# Patient Record
Sex: MALE | Race: BLACK OR AFRICAN AMERICAN
[De-identification: names, ages, dates, MRNs, and addresses within clinical notes are randomized per-mention and may not be internally consistent; named-entity substitution may affect disease eponyms.]

---

## 2019-01-12 NOTE — RADIOLOGY REPORT (SQ)
EXAM DESCRIPTION: 



XR CHEST 1 VIEW



COMPLETED DATE/TME:  01/11/2019 23:55



CLINICAL HISTORY: 



28 years, Male, cp



COMPARISON:

None.



NUMBER OF VIEWS:

1



TECHNIQUE:

Portable chest



LIMITATIONS:

None.



FINDINGS:



Heart size normal. Lungs are clear. No pneumothorax



IMPRESSION:



Negative chest

 



copyright 2011 [employer]- All Rights Reserved

## 2019-01-12 NOTE — ER DOCUMENT REPORT
ED General





- General


Chief Complaint: Chest Pain


Stated Complaint: CHEST PAIN


Time Seen by Provider: 01/11/19 23:54


Notes: 





Patient is a 28-year-old male with a past medical history of hypertension, 

morbid obesity, presents with 3-4 days of intermittent left-sided chest 

discomfort.  Describes it as stabbing, twinging pains to the left lower pectoral

region that come and go randomly.  States they last for several seconds and then

go away spontaneously.  Nothing improves or worsens his symptoms.  States this 

feels similar to when he has irritated muscles of his chest wall in the past.  

Denies associated radiation of the pain, shortness of breath, nausea, vomiting 

or diaphoresis.  Has not seen his primary care doctor regarding today's 

concerns.


TRAVEL OUTSIDE OF THE [place] IN LAST 30 DAYS: No





Past Medical History





- General


Information source: Patient





- Social History


Smoking Status: Current Every Day Smoker


Chew tobacco use (# tins/day): No


Frequency of alcohol use: None


Drug Abuse: None


Lives with: Family


Family History: Reviewed & Not Pertinent


Patient has suicidal ideation: No


Patient has homicidal ideation: No





- Past Medical History


Cardiac Medical History: Reports: Hx Hypertension


Renal/ Medical History: Denies: Hx Peritoneal Dialysis





Review of Systems





- Review of Systems


Notes: 





Constitutional: Negative for fever.


HENT: Negative for sore throat.


Eyes: Negative for visual changes.


Cardiovascular: Positive for chest pain.


Respiratory: Negative for shortness of breath.


Gastrointestinal: Negative for abdominal pain, vomiting or diarrhea.


Genitourinary: Negative for dysuria.


Musculoskeletal: Negative for back pain.


Skin: Negative for rash.


Neurological: Negative for headaches, weakness or numbness.





10 point ROS negative except as marked above and in HPI.





Physical Exam





- Vital signs


Vitals: 


                                        











Temp Pulse Resp BP Pulse Ox


 


 98.4 F   60   16   162/83 H  97 


 


 01/11/19 22:33  01/11/19 22:33  01/11/19 22:33  01/11/19 22:33  01/11/19 22:33











Interpretation: Hypertensive


Notes: 





PHYSICAL EXAMINATION:





GENERAL: Well-appearing, well-nourished and in no acute distress.





HEAD: Atraumatic, normocephalic.





EYES: Pupils equal round and reactive to light, extraocular movements intact, 

sclera anicteric, conjunctiva are normal.





ENT: nares patent, oropharynx clear without exudates.  Moist mucous membranes.





NECK: Normal range of motion, supple without lymphadenopathy





LUNGS: Breath sounds clear to auscultation bilaterally and equal.  No wheezes 

rales or rhonchi.





HEART: Regular rate and rhythm without murmurs





Chest wall: Reproduction of pain on palpation of the lateral and central 

inferior portion of the left chest wall





ABDOMEN: Soft, morbidly obese abdomen, nontender, normoactive bowel sounds.  No 

guarding, no rebound.  No masses appreciated.





EXTREMITIES: Normal range of motion, no pitting or edema.  No cyanosis.





NEUROLOGICAL: No focal neurological deficits. Moves all extremities 

spontaneously and on command.





PSYCH: Normal mood, normal affect.





SKIN: Warm, Dry, normal turgor, no rashes or lesions noted.





Course





- Re-evaluation


Re-evalutation: 





01/12/19 01:26


Presentation of chest pain in an otherwise well appearing patient. Low clinical 

suspicion for ACS given clinical history, exam, EKG without ST elevations or 

depressions, and negative initial troponin. HEART score less than or equal to 3.

PE also seems unlikely given clinical history, absence of tachycardia or dyspne

a. Patient is PERC criteria negative. CXR without evidence of pneumothorax or 

pneumonia. No widened mediastinum. Aortic dissection also seems unlikely given 

history, symmetric pulses, CXR, and vitals.  Chest pain has been intermittent 

over the last 3 days, I do not believe serial troponin markers are indicated.  

Patient's pain is completely reproducible on palpation of the left lower chest, 

consistent with likely musculoskeletal source.  At this time will discharge with

return precautions and follow-up recommendations.  Verbal discharge instructions

given a the bedside and opportunity for questions given. Medication warnings 

reviewed. Patient is in agreement with this plan and has verbalized 

understanding of return precautions and the need for primary care follow-up in 

the next 24-72 hours.





- Vital Signs


Vital signs: 


                                        











Temp Pulse Resp BP Pulse Ox


 


 98.1 F   60   12   157/108 H  100 


 


 01/12/19 01:38  01/11/19 22:33  01/12/19 01:01  01/12/19 01:01  01/12/19 01:01














- Diagnostic Test


Radiology reviewed: Image reviewed, Reports reviewed


Radiology results interpreted by me: 





01/12/19 01:26


Chest x-ray: No acute infiltrate or pneumothorax





- EKG Interpretation by Me


Additional EKG results interpreted by me: 





01/12/19 01:27


Sinus rhythm, rate 88.  No ST elevations or depressions.  QTC is 417.





Discharge





- Discharge


Clinical Impression: 


 Chest wall pain





Hypertension


Qualifiers:


 Hypertension type: essential hypertension Qualified Code(s): I10 - Essential 

(primary) hypertension





Condition: Good


Disposition: HOME, SELF-CARE


Additional Instructions: 


You were seen today for chest pain.  Your pain appears likely related to the 

muscles of your chest wall.  Please take the anti-inflammatories that has been 

prescribed as directed.  Based on your cardiac enzyme testing, chest x-ray, and 

EKG it does not appear that it is from an immediately life-threatening cause at 

this time.  Please return to emergency department immediately if you have 

worsening of your chest pain, shortness of breath, vomiting, become unable to 

exert yourself due to pain or difficulty breathing, you pass out, or have any 

pain that radiates into your arms, jaw, or back. Please also return if you have 

any additional symptoms that are concerning to you.


Prescriptions: 


Naproxen 500 mg PO BID PRN #14 tablet


 PRN Reason:

## 2019-01-12 NOTE — EKG REPORT
SEVERITY:- BORDERLINE ECG -

SINUS RHYTHM

BORDERLINE T ABNORMALITIES, INFERIOR LEADS

:

Confirmed by: Garrett Harris MD 12-Jan-2019 09:13:50

## 2020-05-17 ENCOUNTER — HOSPITAL ENCOUNTER (EMERGENCY)
Dept: HOSPITAL 62 - ER | Age: 30
Discharge: HOME | End: 2020-05-17
Payer: SELF-PAY

## 2020-05-17 VITALS — DIASTOLIC BLOOD PRESSURE: 87 MMHG | SYSTOLIC BLOOD PRESSURE: 143 MMHG

## 2020-05-17 DIAGNOSIS — I10: ICD-10-CM

## 2020-05-17 DIAGNOSIS — R10.33: ICD-10-CM

## 2020-05-17 DIAGNOSIS — F17.200: ICD-10-CM

## 2020-05-17 DIAGNOSIS — R10.9: ICD-10-CM

## 2020-05-17 DIAGNOSIS — K43.9: Primary | ICD-10-CM

## 2020-05-17 PROCEDURE — 99283 EMERGENCY DEPT VISIT LOW MDM: CPT

## 2020-05-17 PROCEDURE — 76705 ECHO EXAM OF ABDOMEN: CPT

## 2020-05-17 NOTE — ER DOCUMENT REPORT
ED Medical Screen (RME)





- General


Chief Complaint: Abdominal Pain


Stated Complaint: POSSIBLE HERNIA


Time Seen by Provider: 05/17/20 13:07


Mode of Arrival: Ambulatory


Information source: Patient


Notes: 





30-year-old male patient presents emergency department chief complaint of 

possible hernia.  Patient reports he has known he has a hernia near the 

umbilicus for quite some time however today it has popped out and is causing him

increased pain.  He denies any fever, nausea, vomiting or any other illness.





Palpable hernia near the umbilicus.





I have greeted and performed a rapid initial assessment of this patient.  A 

comprehensive ED assessment and evaluation of the patient, analysis of test 

results and completion of the medical decision making process will be conducted 

by additional ED providers.  I have specifically instructed the patient or 

family members with the patient to immediately return to any nursing staff 

should anything change in the patient's condition or with their chief complaint.





TRAVEL OUTSIDE OF THE [place] IN LAST 30 DAYS: No





- Related Data


Allergies/Adverse Reactions: 


                                        





No Known Allergies Allergy (Unverified 05/17/20 13:07)


   











Past Medical History





- Past Medical History


Cardiac Medical History: Reports: Hx Hypertension


Renal/ Medical History: Denies: Hx Peritoneal Dialysis





Physical Exam





- Vital signs


Vitals: 





                                        











Temp Pulse Resp BP Pulse Ox


 


 98.8 F   69   18   162/90 H  99 


 


 05/17/20 12:45  05/17/20 12:45  05/17/20 12:45  05/17/20 12:45  05/17/20 12:45














Course





- Vital Signs


Vital signs: 





                                        











Temp Pulse Resp BP Pulse Ox


 


 98.8 F   69   18   162/90 H  99 


 


 05/17/20 12:45  05/17/20 12:45  05/17/20 12:45  05/17/20 12:45  05/17/20 12:45

## 2020-05-17 NOTE — ER DOCUMENT REPORT
ED GI/





- General


Chief Complaint: Abdominal Pain


Stated Complaint: POSSIBLE HERNIA


Time Seen by Provider: 05/17/20 13:07


Primary Care Provider: 


[facility] [Provider Group] - Follow up in 3-5 days


Mode of Arrival: Ambulatory


Information source: Patient


Notes: 





Patient presents complaining of periumbilical tenderness that started around 

3:00 this morning.  Patient states pain woke him up.  Patient states that he has

had a bulge here and thinks he has a hernia.  Patient denies any recent trauma. 

Patient denies any fever nausea or vomiting.  Patient denies any blood in the 

stool.


TRAVEL OUTSIDE OF THE [place] IN LAST 30 DAYS: No





- HPI


Patient complains to provider of: Abdominal pain


Onset: This morning


Timing/Duration: Sudden


Quality of pain: Sharp


Pain Level: 5


Location: Other - Periumbilical


Associated symptoms: denies: Constipation, Diarrhea, Nausea, Urinary hesitancy, 

Urinary frequency, Urinary retention, Urinary urgency, Vomiting


Exacerbated by: Movement


Relieved by: Denies


Similar symptoms previously: No


Recently seen / treated by doctor: No





- Related Data


Allergies/Adverse Reactions: 


                                        





No Known Allergies Allergy (Unverified 05/17/20 13:07)


   











Past Medical History





- General


Information source: Patient





- Social History


Smoking Status: Current Every Day Smoker


Chew tobacco use (# tins/day): No


Frequency of alcohol use: None


Drug Abuse: None


Occupation: None


Lives with: Family


Family History: Reviewed & Not Pertinent


Patient has homicidal ideation: No





- Past Medical History


Cardiac Medical History: Reports: Hx Hypertension


Renal/ Medical History: Denies: Hx Peritoneal Dialysis


Surgical Hx: Negative





Review of Systems





- Review of Systems


Constitutional: No symptoms reported.  denies: Fever


EENT: No symptoms reported


Cardiovascular: No symptoms reported.  denies: Chest pain


Respiratory: No symptoms reported.  denies: Cough, Short of breath


Gastrointestinal: Abdominal pain.  denies: Diarrhea, Nausea, Vomiting


Genitourinary: No symptoms reported.  denies: Dysuria, Flank pain


Male Genitourinary: No symptoms reported


Musculoskeletal: No symptoms reported.  denies: Back pain


Skin: No symptoms reported


Hematologic/Lymphatic: No symptoms reported


Neurological/Psychological: No symptoms reported





Physical Exam





- Vital signs


Vitals: 


                                        











Temp Pulse Resp BP Pulse Ox


 


 98.8 F   69   18   162/90 H  99 


 


 05/17/20 12:45  05/17/20 12:45  05/17/20 12:45  05/17/20 12:45  05/17/20 12:45














- General


General appearance: Appears well, Alert


In distress: None





- HEENT


Head: Normocephalic, Atraumatic


Eyes: Normal


Conjunctiva: Normal


Nasal: Normal


Mouth/Lips: Normal, Other


Neck: Normal, Supple.  No: Lymphadenopathy





- Respiratory


Respiratory status: No respiratory distress


Chest status: Nontender


Breath sounds: Normal.  No: Rales, Rhonchi, Stridor, Wheezing


Chest palpation: Normal





- Cardiovascular


Rhythm: Regular


Heart sounds: S1 appreciated, S2 appreciated





- Abdominal


Inspection: Morbidly Obese


Distension: No distension


Bowel sounds: Normal


Tenderness: Tender - Tenderness to the periumbilical hernia


Organomegaly: No organomegaly


Notes: 





Patient placed in Trendelenburg position, palpable defect noted to the umbilical

area, very small hernia was able to be reduced without difficulty.





- Back


Back: Normal





- Extremities


General upper extremity: Normal inspection, Normal strength


General lower extremity: Normal inspection, Normal strength





- Neurological


Neuro grossly intact: Yes


Cognition: Normal


Orientation: AAOx4


[place] Coma Scale Eye Opening: Spontaneous


[place] Coma Scale Verbal: Oriented


[place] Coma Scale Motor: Obeys Commands


[place] Coma Scale Total: 15





- Psychological


Associated symptoms: Normal affect, Normal mood





- Skin


Skin Temperature: Warm


Skin Moisture: Dry


Skin Color: Normal





Course





- Re-evaluation


Re-evalutation: 





05/17/20 15:49


Patient with ventral hernia, no concern for any bowel incarceration.  Hernia was

able to be reduced.  Patient without any guarding or peritoneal symptoms.  

Patient otherwise nontoxic in appearance.  Will encourage outpatient follow-up 

with a surgeon.  Discussed with patient worsening signs or symptoms that they 

should return immediately for.





- Vital Signs


Vital signs: 


                                        











Temp Pulse Resp BP Pulse Ox


 


 98.8 F   69   18   162/90 H  99 


 


 05/17/20 13:07  05/17/20 12:45  05/17/20 12:45  05/17/20 12:45  05/17/20 12:45














- Diagnostic Test


Radiology reviewed: Reports reviewed





Discharge





- Discharge


Clinical Impression: 


Ventral hernia


Qualifiers:


 Obstruction and gangrene presence: without obstruction or gangrene Qualified 

Code(s): K43.9 - Ventral hernia without obstruction or gangrene





Condition: Stable


Disposition: HOME, SELF-CARE


Instructions:  Hernia ([facility])


Additional Instructions: 


Return immediately for any new or worsening symptoms





Followup with your primary care provider, call tomorrow to make a followup 

appointment





Follow-up with a general surgeon for management of your hernia, call tomorrow to

make a follow-up appointment


Prescriptions: 


Hydrocodone/Acetaminophen [Norco 5-325 mg Tablet] 1 tab PO Q6 PRN #8 tablet


 PRN Reason: 


Referrals: 


[facility] [Provider Group] - Follow up in 3-5 days

## 2020-05-17 NOTE — RADIOLOGY REPORT (SQ)
EXAM DESCRIPTION:  U/S ABDOMEN LIMITED W/O DOP



IMAGES COMPLETED DATE/TIME:  5/17/2020 1:27 pm



REASON FOR STUDY:  eval for umbilical hernia

eval for umbilical hernia.  Known umbilical hernia which has increased in visibility and pain today.



COMPARISON:  None.



TECHNIQUE:  Dynamic and static grayscale images acquired of the localized site of clinical concern an
d recorded on PACS. Additional selected color Doppler and spectral images recorded.

SITE OF CONCERN: Ventral abdomen



LIMITATIONS:  None.



FINDINGS:  SKIN AND SUBCUTANEOUS TISSUES: There is a heterogeneous masslike appearance just superior 
to the umbilicus, likely representing herniated bowel within a ventral hernia. No fluid collections. 
No edema. No foreign bodies.

DEEP SOFT TISSUES/MUSCLES: No masses.  No fluid collections. No edema.

VASCULAR: No increased or decreased vascularity.  No occlusions.

OTHER: No other significant finding.



IMPRESSION:  Probable ventral hernia containing bowel.  No fluid in the hernia sac.



TECHNICAL DOCUMENTATION:  JOB ID:  [id number]

 2011 [employer]- All Rights Reserved



Reading location - IP/workstation name: [id number]